# Patient Record
Sex: MALE | Race: WHITE | NOT HISPANIC OR LATINO | Employment: FULL TIME | URBAN - NONMETROPOLITAN AREA
[De-identification: names, ages, dates, MRNs, and addresses within clinical notes are randomized per-mention and may not be internally consistent; named-entity substitution may affect disease eponyms.]

---

## 2017-06-24 ENCOUNTER — NON-PROVIDER VISIT (OUTPATIENT)
Dept: URGENT CARE | Facility: PHYSICIAN GROUP | Age: 27
End: 2017-06-24

## 2017-06-24 DIAGNOSIS — Z02.1 PRE-EMPLOYMENT DRUG SCREENING: ICD-10-CM

## 2017-06-24 LAB
AMP AMPHETAMINE: NORMAL
COC COCAINE: NORMAL
INT CON NEG: NORMAL
INT CON POS: NORMAL
MET METHAMPHETAMINES: NORMAL
OPI OPIATES: NORMAL
PCP PHENCYCLIDINE: NORMAL
POC DRUG COMMENT 753798-OCCUPATIONAL HEALTH: NEGATIVE
THC: NORMAL

## 2017-06-24 PROCEDURE — 80305 DRUG TEST PRSMV DIR OPT OBS: CPT | Performed by: PHYSICIAN ASSISTANT

## 2018-10-17 ENCOUNTER — HOSPITAL ENCOUNTER (EMERGENCY)
Facility: MEDICAL CENTER | Age: 28
End: 2018-10-18
Attending: EMERGENCY MEDICINE
Payer: COMMERCIAL

## 2018-10-17 VITALS
HEART RATE: 73 BPM | WEIGHT: 186.29 LBS | RESPIRATION RATE: 16 BRPM | DIASTOLIC BLOOD PRESSURE: 73 MMHG | OXYGEN SATURATION: 97 % | SYSTOLIC BLOOD PRESSURE: 117 MMHG | HEIGHT: 74 IN | TEMPERATURE: 98.1 F | BODY MASS INDEX: 23.91 KG/M2

## 2018-10-17 DIAGNOSIS — T15.01XA CORNEAL FOREIGN BODY WITH RESIDUAL MATERIAL, RIGHT, INITIAL ENCOUNTER: ICD-10-CM

## 2018-10-17 PROCEDURE — 99284 EMERGENCY DEPT VISIT MOD MDM: CPT

## 2018-10-17 ASSESSMENT — PAIN DESCRIPTION - DESCRIPTORS: DESCRIPTORS: BURNING

## 2018-10-17 ASSESSMENT — PAIN SCALES - GENERAL: PAINLEVEL_OUTOF10: 9

## 2018-10-18 ENCOUNTER — TELEPHONE (OUTPATIENT)
Dept: PHARMACY | Facility: MEDICAL CENTER | Age: 28
End: 2018-10-18

## 2018-10-18 PROCEDURE — 700111 HCHG RX REV CODE 636 W/ 250 OVERRIDE (IP): Performed by: EMERGENCY MEDICINE

## 2018-10-18 PROCEDURE — 90715 TDAP VACCINE 7 YRS/> IM: CPT | Performed by: EMERGENCY MEDICINE

## 2018-10-18 PROCEDURE — 90471 IMMUNIZATION ADMIN: CPT

## 2018-10-18 PROCEDURE — 303754 HCHG EYE PATCH

## 2018-10-18 PROCEDURE — 65205 REMOVE FOREIGN BODY FROM EYE: CPT

## 2018-10-18 PROCEDURE — 700101 HCHG RX REV CODE 250: Performed by: EMERGENCY MEDICINE

## 2018-10-18 RX ORDER — PROPARACAINE HYDROCHLORIDE 5 MG/ML
1 SOLUTION/ DROPS OPHTHALMIC ONCE
Status: COMPLETED | OUTPATIENT
Start: 2018-10-18 | End: 2018-10-18

## 2018-10-18 RX ORDER — GENTAMICIN SULFATE 3 MG/ML
1 SOLUTION/ DROPS OPHTHALMIC
Qty: 1 BOTTLE | Refills: 0 | Status: SHIPPED | OUTPATIENT
Start: 2018-10-18 | End: 2018-10-25

## 2018-10-18 RX ORDER — HYDROCODONE BITARTRATE AND ACETAMINOPHEN 5; 325 MG/1; MG/1
1-2 TABLET ORAL EVERY 6 HOURS PRN
Qty: 7 TAB | Refills: 0 | Status: SHIPPED | OUTPATIENT
Start: 2018-10-18 | End: 2018-10-20

## 2018-10-18 RX ADMIN — CLOSTRIDIUM TETANI TOXOID ANTIGEN (FORMALDEHYDE INACTIVATED), CORYNEBACTERIUM DIPHTHERIAE TOXOID ANTIGEN (FORMALDEHYDE INACTIVATED), BORDETELLA PERTUSSIS TOXOID ANTIGEN (GLUTARALDEHYDE INACTIVATED), BORDETELLA PERTUSSIS FILAMENTOUS HEMAGGLUTININ ANTIGEN (FORMALDEHYDE INACTIVATED), BORDETELLA PERTUSSIS PERTACTIN ANTIGEN, AND BORDETELLA PERTUSSIS FIMBRIAE 2/3 ANTIGEN 0.5 ML: 5; 2; 2.5; 5; 3; 5 INJECTION, SUSPENSION INTRAMUSCULAR at 00:32

## 2018-10-18 RX ADMIN — FLUORESCEIN SODIUM 0.6 MG: 0.6 STRIP OPHTHALMIC at 00:30

## 2018-10-18 RX ADMIN — PROPARACAINE HYDROCHLORIDE 1 DROP: 5 SOLUTION/ DROPS OPHTHALMIC at 00:30

## 2018-10-18 NOTE — ED TRIAGE NOTES
Pt works as a  he noticed today at work pain to both eyes,pt states he has a welding burn to right and left eye right worrse, at the end of the work day the pain in the right eye was so bad he could not open the right eye, pt denies trauma, denies FB sensation, pt has history of the same several years ago, pt to triage with sunglasses on, pt has taken tylenol pta

## 2018-10-18 NOTE — ED NOTES
Pt provided discharge instructions, pt verbalized understanding of discharge instructions. Pt alert and oriented x4, gcs15, VSS, resp even and nl. Supplies provided to care for eye and eye patch. Pt provided RX x2. Pt verbalized understanding of RX. This RX explained narcotic agreement to pt. Pt signed narcotic agreement and verbalized understanding to this RN. Pt ambulates to exit with steady gait.

## 2018-10-18 NOTE — DISCHARGE INSTRUCTIONS
Do not drive, operate any machinery, or partake in dangerous activities that require maximum physical and mental performance while taking the prescribed pain killer. Do not take tylenol or tylenol containing products in addition to the pain killer that was prescibed, as the excess tylenol can cause life threatening liver problems. Do not combine this drug with alcohol or other sedatives or narcotics. Follow up with your primary care doctor in regards to the future management of this medication.

## 2018-10-18 NOTE — ED PROVIDER NOTES
"ED Provider Note    Scribed for Bebe Walton M.D. by Debbie King. 10/18/2018, 12:03 AM.    Primary care provider: Pcp Pt States None  Means of arrival: walk-in  History obtained from: Patient  History limited by: none    CHIEF COMPLAINT  Chief Complaint   Patient presents with   • Eye Pain       HPI  Bimal Wallis is a 28 y.o. male who presents to the Emergency Department with bilateral eye pain onset today R>L. 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 He states that he has pain in both of his eyes but the pain is worse in the right eye. Patient also reports that he has blurry vision in both eyes but he cannot open his right eye. Patient states that the last time he saw an eye doctor was approximately 18 years ago. Patient denies wearing eye contacts. He reports that his last tetanus vaccine was 5 years ago.    REVIEW OF SYSTEMS  Positives as above. Pertinent negatives include eye contacts. All other review of systems are negative    PAST MEDICAL HISTORY   None noted    SURGICAL HISTORY   has a past surgical history that includes other orthopedic surgery (Left, 2010).    SOCIAL HISTORY  Social History   Substance Use Topics   • Smoking status: Former Smoker     Packs/day: 1.00     Types: Cigarettes     Start date: 9/29/2007   • Smokeless tobacco: Never Used   • Alcohol use Yes      Comment: rare      History   Drug Use No       FAMILY HISTORY  History reviewed. No pertinent family history.    CURRENT MEDICATIONS  Reviewed. See Encounter Summary.     ALLERGIES  Allergies   Allergen Reactions   • Amoxicillin    • Biaxin [Clarithromycin]    • Pcn [Penicillins]        PHYSICAL EXAM  VITAL SIGNS: /73   Pulse 73   Temp 36.7 °C (98.1 °F) (Temporal)   Resp 16   Ht 1.88 m (6' 2\")   Wt 84.5 kg (186 lb 4.6 oz)   SpO2 97%   BMI 23.92 kg/m²    Pulse ox interpretation: I interpret this pulse " ox as normal.  Constitutional: Alert in no apparent distress.  HENT:  Normocephalic atraumatic, MMM.   Neck: Normal range of motion, No tenderness, Supple, No stridor.   Cardiovascular: Regular rate and rhythm, no murmurs.   Thorax & Lungs: Normal breath sounds, No respiratory distress, No wheezing, No chest tenderness.   Skin: Warm, Dry, No erythema, No rash.   Back: No bony tenderness, No CVA tenderness.   Extremities: Intact distal pulses, No edema, No tenderness, No cyanosis  Neurologic: Alert and oriented, No focal deficits noted.   Eye exam: PERRLA With flourescein negative Lizabeth's sign. Right eye has abrasion at 7 and 8 o'clock. Foreign body at 4 o'clock with rust ring, foreign body appears metallic.Bilateral conjunctival injection with more mild periorbital swelling to right compared to the left.    DIFFERENTIAL DIAGNOSIS AND WORK UP PLAN    12:03 AM Patient seen and examined at bedside. The patient presents with bilateral eye pain and the differential diagnosis includes but is not limited to heat carotidis, corneal foreign body, corneal abrasion. Patient will be treated with ADACEL injection 0.5 ml, proparacaine 0.5% 1 drop, fluorescein opthalmic strip 0.6 mg for his symptoms. I performed a foreign body removal in patient's right eye, restoring still in place. Patient was agreeable with plan of care at this time.    DIAGNOSTIC STUDIES / PROCEDURES     Foreign Body Removal Procedure Note    Indication: Suspected foreign body in right eye.     Procedure: The area of the foreign body was visualized via fluorescein  and slit lamp. Local anesthesia over the foreign body site was proparacaine. The foreign body was then removed using dremel and had the appearance of metal - FB removed however rust ring remained.  After the procedure wound and eye patch. The patient's tetanus status was updated with a tetanus booster.    The patient tolerated the procedure well.    Complications: None     COURSE & MEDICAL DECISION  MAKING  Pertinent Labs & Imaging studies reviewed. (See chart for details)    The patient will return for new or worsening symptoms and is stable at the time of discharge.    Patient is a 28-year-old male up-to-date on his tetanus who presented the emergency department today with chief complaint of bilateral eye pain.  He only had conjunctival injection on the left and possible irritation from the welding today however he had multiple corneal abrasions and a foreign body in the right eye with a rest ring even after removal of the metallic foreign body.  He will be started on ophthalmic antibiotics given pain management and a eye patch for the right eye and be referred to ophthalmology within the next 24-48 hours.  He understands return to the ED for any new or worsening symptoms.    Visual acuity   L eye 20/20  R eye 20/25  Bilat eye 20/20    In prescribing controlled substances to this patient, I certify that I have obtained and reviewed the medical history of Bimal Wallis. I have also made a good marylin effort to obtain applicable records from other providers who have treated the patient and records did not demonstrate any increased risk of substance abuse that would prevent me from prescribing controlled substances.     I have conducted a physical exam and documented it. I have reviewed Mr. Wallis’S prescription history as maintained by the [place] Prescription Monitoring Program.     I have assessed the patient’s risk for abuse, dependency, and addiction using the validated Opioid Risk Tool available at https://www.mdcalc.com/qauzju-cqwf-wxbo-ort-narcotic-abuse. 1    Given the above, I believe the benefits of controlled substance therapy outweigh the risks. The reasons for prescribing controlled substances include non-narcotic, oral analgesic alternatives have been inadequate for pain control. Accordingly, I have discussed the risk and benefits, treatment plan, and alternative therapies with the patient.        DISPOSITION:  Patient will be discharged home in stable condition.    FOLLOW UP:  Abad Sanchez M.D.  [id number]  [place] [address]  [phone]    Call on 10/18/2018  to make an appointment for follow up    [facility], Emergency Dept  [address]  [place] [address] [phone]    If symptoms worsen      OUTPATIENT MEDICATIONS:  New Prescriptions    GENTAMICIN (GARAMYCIN) 0.3 % OINTMENT    Place 0.5 Inches in right eye 3 times a day for 7 days.    HYDROCODONE-ACETAMINOPHEN (NORCO) 5-325 MG TAB PER TABLET    Take 1-2 Tabs by mouth every 6 hours as needed (severe pain) for up to 2 days.       FINAL IMPRESSION  1. Corneal foreign body with residual material, right, initial encounter          Debbie SINGH (Scribe), am scribing for, and in the presence of, Bebe Walton M.D..    Electronically signed by: Debbie King (Scribe), 10/18/2018    IBebe M.D. personally performed the services described in this documentation, as scribed by Debbie King in my presence, and it is both accurate and complete. C.    The note accurately reflects work and decisions made by me.  Bebe Walton  10/18/2018  4:34 AM    This dictation has been created using voice recognition software and/or scribes. The accuracy of the dictation is limited by the abilities of the software and the expertise of the scribes. I expect there may be some errors of grammar and possibly content. I made every attempt to manually correct the errors within my dictation. However, errors related to voice recognition software and/or scribes may still exist and should be interpreted within the appropriate context.

## 2018-10-18 NOTE — ED NOTES
Patient's wife called and she has been unable to fill rx for gentamicin ointment as it is unavailable in the community.   Rx changed to gentamicin ophthalmic solution, I drop in eye 5 times daily x 7 days. 1 bottle, no refills.  Clarithromycin allergy - unknown reaction since he was little. Unable to prescribe erythromycin ointment.     Fanny Gutierrez, PharmD

## 2018-10-18 NOTE — ED NOTES
Pt ambulated to ED 19 with steady gait. Pt alert and oriented x4, gcs 15, resp even and nl. Pt assisted with changed into gown. Pt on full cardiac monitor. Pt provided warm blanket for comfort. Call light within reach, instructed how to use call light. Bed locked in low position. Chart up for ERP. Will continue to monitor.

## 2018-10-18 NOTE — ED NOTES
Kittrell 1 Fall Risk Assessment Tool     Present to ED because of fall  NO  (Syncope, seizure, or ALOC)  Age>70   NO    Altered Mental Status:  (Intoxicated with Alcohol or Substance Confusion,  Inability to follow instructions, disorientation) NO    Impaired Mobility:  Ambulates or transfers with assistive devices or assist  Ambulates with unsteady gait and no assistance  Unable to ambulate or transfer   NO    Nursing Judgement  (Bowel or bladder incontinence, diarrhea, urinary   frequency or urgency, leg weakness, orthostatic   hypotension, dizziness or vertigo, narcotic use).   NO     Fall Risk Interventions  -Familiarize the patient with environment.  -Place call light within reach and demonstrate call light use.  -Place stretcher in low position and brakes locked  -Keep floor surfaces clean and dry.  -Keep patient care areas uncluttered.

## 2018-10-19 ENCOUNTER — PATIENT OUTREACH (OUTPATIENT)
Dept: HEALTH INFORMATION MANAGEMENT | Facility: OTHER | Age: 28
End: 2018-10-19

## 2018-10-19 ENCOUNTER — OCCUPATIONAL MEDICINE (OUTPATIENT)
Dept: OCCUPATIONAL MEDICINE | Facility: CLINIC | Age: 28
End: 2018-10-19
Payer: COMMERCIAL

## 2018-10-19 VITALS
OXYGEN SATURATION: 96 % | HEIGHT: 74 IN | HEART RATE: 82 BPM | TEMPERATURE: 98.5 F | DIASTOLIC BLOOD PRESSURE: 62 MMHG | SYSTOLIC BLOOD PRESSURE: 94 MMHG | BODY MASS INDEX: 23.74 KG/M2 | WEIGHT: 185 LBS

## 2018-10-19 DIAGNOSIS — S05.01XD ABRASION OF RIGHT CORNEA, SUBSEQUENT ENCOUNTER: ICD-10-CM

## 2018-10-19 PROCEDURE — 99202 OFFICE O/P NEW SF 15 MIN: CPT | Performed by: PREVENTIVE MEDICINE

## 2018-10-19 NOTE — PROGRESS NOTES
10/19/2018 1047 - Discharge Outreach - received call from patient. States he was told to f/u with opthamology, but when he called the office, was told he needed MD in ED to call(or possibly needing referral?). Transferred call to ED.

## 2018-10-19 NOTE — LETTER
"EMPLOYEE’S CLAIM FOR COMPENSATION/ REPORT OF INITIAL TREATMENT  FORM C-4    EMPLOYEE’S CLAIM - PROVIDE ALL INFORMATION REQUESTED   First Name  Bimal Last Name  Kadi Birthdate                    1990                Sex  male Claim Number   Home Address  [address] Age  28 y.o. 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 (if applicable)  Welding, cutting/grinding steel    How did this injury or occupational disease occur? (Be specific an answer in detail. Use additional sheet if necessary)  Not sure if something was in my eye or it was flash burn, but I had persist pain all day long. Eyes were blood shot red.   Happened throughout the day welding and grinding. Eye got severely irritated and sensitive to light.   If you believe that you have an occupational disease, when did you first have knowledge of the disability and it relationship to your employment?  N/A Witnesses to the Accident  Another coworker       Nature of Injury or Occupational Disease  Foreign Body   Part(s) of Body Injured or " Affected  Eye (R)     I certify that the above is true and correct to the best of my knowledge and that I have provided this information in order to obtain the benefits of [place]  I hereby authorize any physician, chiropractor, surgeon, practitioner, or other person, any hospital, including [facility], any medical service organization, any insurance company, or other institution or organization to release to each other, any medical or other information, including benefits paid or payable, pertinent to this injury or disease, except information relative to diagnosis, treatment and/or counseling for AIDS, psychological conditions, alcohol or controlled substances, for which I must give specific authorization.  A Photostat of this authorization shall be as valid as the original.     Date   Place   Employee’s Signature   THIS REPORT MUST BE COMPLETED AND MAILED WITHIN 3 WORKING DAYS OF TREATMENT   Place  [facility]  Name of [facility]   Date  10/19/2018 Diagnosis  (S05.01XD) Abrasion of right cornea, subsequent encounter Is there evidence the injured employee was under the influence of alcohol and/or another controlled substance at the time of accident?   Hour  4:32 PM Description of Injury or Disease  The encounter diagnosis was Abrasion of right cornea, subsequent encounter. No   Treatment  Foreign body removal  Have you advised the patient to remain off work five days or more? No   X-Ray Findings      If Yes   From Date  To Date      From information given by the employee, together with medical evidence, can you directly connect this injury or occupational disease as job incurred?  Yes If No Full Duty  No Modified Duty      Is additional medical care by a physician indicated?  Yes If Modified Duty, Specify any Limitations / Restrictions  No  "welding   Do you know of any previous injury or disease contributing to this condition or occupational disease?                            No   Date  10/22/2018 Print Doctor’s Name Joesph Quiles D.O. I certify the employer’s copy of  this form was mailed on:   Address  [address] [place] [address],   [address] Insurer’s Use Only     [facility] Zip  [address]    Provider’s Tax ID Number  [id number] Telephone  Dept: [phone]        e-SignTAYLORJOESPH D.O.   e-Signature: Dr. Len Hendricks, Medical Director Degree  DO        ORIGINAL-TREATING PHYSICIAN OR CHIROPRACTOR    PAGE 2-INSURER/TPA    PAGE 3-EMPLOYER    PAGE 4-EMPLOYEE             Form C-4 (rev10/07)              BRIEF DESCRIPTION OF RIGHTS AND BENEFITS  (Pursuant to [place] 616C.[place])    Notice of Injury or Occupational Disease (Incident Report Form C-1): If an injury or occupational disease (OD) arises out of and in the  course of employment, you must provide written notice to your employer as soon as practicable, but no later than 7 days after the accident or  OD. Your employer shall maintain a sufficient supply of the required forms.    Claim for Compensation (Form C-4): If medical treatment is sought, the form C-4 is available at the place of initial treatment. A completed  \"Claim for Compensation\" (Form C-4) must be filed within 90 days after an accident or OD. The treating physician or chiropractor must,  within 3 working days after treatment, complete and mail to the employer, the employer's insurer and third-party , the Claim for  Compensation.    Medical Treatment: If you require medical treatment for your on-the-job injury or OD, you may be required to select a physician or  chiropractor from a list provided by your workers’ compensation insurer, if it has contracted with an Organization for Managed Care (MCO) or  Preferred Provider Organization (PPO) or providers of health care. If your employer has not entered into a contract with an MCO " or PPO, you  may select a physician or chiropractor from the Panel of Physicians and Chiropractors. Any medical costs related to your industrial injury or  OD will be paid by your insurer.    Temporary Total Disability (TTD): If your doctor has certified that you are unable to work for a period of at least 5 consecutive days, or 5  cumulative days in a 20-day period, or places restrictions on you that your employer does not accommodate, you may be entitled to TTD  compensation.    Temporary Partial Disability (TPD): If the wage you receive upon reemployment is less than the compensation for TTD to which you are  entitled, the insurer may be required to pay you TPD compensation to make up the difference. TPD can only be paid for a maximum of 24  months.    Permanent Partial Disability (PPD): When your medical condition is stable and there is an indication of a PPD as a result of your injury or  OD, within 30 days, your insurer must arrange for an evaluation by a rating physician or chiropractor to determine the degree of your PPD. The  amount of your PPD award depends on the date of injury, the results of the PPD evaluation and your age and wage.    Permanent Total Disability (PTD): If you are medically certified by a treating physician or chiropractor as permanently and totally disabled  and have been granted a PTD status by your insurer, you are entitled to receive monthly benefits not to exceed 66 2/3% of your average  monthly wage. The amount of your PTD payments is subject to reduction if you previously received a PPD award.    Vocational Rehabilitation Services: You may be eligible for vocational rehabilitation services if you are unable to return to the job due to a  permanent physical impairment or permanent restrictions as a result of your injury or occupational disease.    Transportation and Per Armando Reimbursement: You may be eligible for travel expenses and per armando associated with medical  treatment.    Reopening: You may be able to reopen your claim if your condition worsens after claim closure.    Appeal Process: If you disagree with a written determination issued by the insurer or the insurer does not respond to your request, you may  appeal to the Department of Administration, , by following the instructions contained in your determination letter. You must  appeal the determination within 70 days from the date of the determination letter at [address], [address], [place], [place]  [address], or [address], [address], [place], [place] [address]. If you disagree with the  decision, you may appeal to the  Department of Administration, . You must file your appeal within 30 days from the date of the  decision  letter at [address], [address], [place], [place] [address], or [address], [place], [place] [address]. If you  disagree with a decision of an , you may file a petition for judicial review with the District Court. You must do so within 30  days of the Appeal Officer’s decision. You may be represented by an  at your own expense or you may contact the [facility] for possible  representation.    [place]  for Injured Workers (NAIW): If you disagree with a  decision, you may request that NAIW represent you  without charge at an  Hearing. For information regarding denial of benefits, you may contact the [facility] at: [address], [place] [address], [phone], or [address], [place] [address], [phone]    To File a Complaint with the Division: If you wish to file a complaint with the  of the Division of Industrial Relations (DIR),  please contact the Workers’ Compensation Section, [address], [address], [place], [place] [address], telephone [phone]) [phone], or  [address], [address], [place], [place] [address], telephone [phone].    For assistance with Workers’ Compensation Issues: you may contact the Office of the Governor Consumer Health Assistance, [address] [place] [address], [address], [place], [place] [address], Toll Free 1-774.940.3029, Web site: http://ARKeX.[facility].[email or web address]., E-mail  [email or web address].                                                                                                                                                                                                                                   __________________________________________________________________                                                                   _________________                Employee Name / Signature                                                                                                                                                       Date                                                                                                                                                                                                     D-2 (rev. 10/07)

## 2018-10-19 NOTE — LETTER
[address],   [address] [address]  Phone:  [phone] - Fax:  [phone]   Occupational Health [facility] Progress Report and Disability Certification  Date of Service: 10/19/2018   No Show:  No  Date / Time of Next Visit: 10/25/2018   Claim Information   Patient Name: Bimal Wallis  Claim Number:     Employer:   [employer] Date of Injury: 10/17/2018     Insurer / TPA: [employer]  ID / SSN:     Occupation: -Fabricator  Diagnosis: The encounter diagnosis was Abrasion of right cornea, subsequent encounter.    Medical Information   Related to Industrial Injury? Yes    Subjective Complaints:  DOI 10/17/2018: 29 yo male presents with bilateral eye pain R>L. 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  He was seen in the ER where as noted he had multiple right sided corneal abrasions with a small metallic foreign body with the rust ring.  Using gentamicin drops.   Objective Findings: Eyes: Small rust ring at about the 4 o'clock position.  Some scleral injection.  No purulent discharge.  Vision 20/15 in both eyes individually and 20/13 combined.   Pre-Existing Condition(s):     Assessment:   Condition Improved    Status: Additional Care Required  Permanent Disability:No    Plan:      Diagnostics:      Comments:  Referral to ophthalmology on urgent basis  Continue gentamicin ointment  Restricted duty  Follow-up Thursday      Disability Information   Status: Released to Restricted Duty    From:  10/19/2018  Through: 10/25/2018 Restrictions are: Temporary   Physical Restrictions   Sitting:    Standing:    Stooping:    Bending:      Squatting:    Walking:    Climbing:    Pushing:      Pulling:    Other:    Reaching Above Shoulder (L):   Reaching Above Shoulder (R):       Reaching Below Shoulder (L):    Reaching Below  Shoulder (R):      Not to exceed Weight Limits   Carrying(hrs):   Weight Limit(lb):   Lifting(hrs):   Weight  Limit(lb):     Comments: No welding    Repetitive Actions   Hands: i.e. Fine Manipulations from Grasping:     Feet: i.e. Operating Foot Controls:     Driving / Operate Machinery:     Physician Name: Joesph Quiles D.O. Physician Signature: JOESPH Gaxiola D.O. e-Signature: Dr. Len Hendricks, Medical Director   Clinic Name / Location: [address],   [address] [place] [id number] Clinic Phone Number: Dept: [phone]   Appointment Time: 4:35 Pm Visit Start Time: 4:32 PM   Check-In Time:  4:31 Pm Visit Discharge Time: 5:31 PM   Original-Treating Physician or Chiropractor    Page 2-Insurer/TPA    Page 3-Employer    Page 4-Employee

## 2018-10-19 NOTE — PROGRESS NOTES
"Subjective:      Bimal Wallis is a 28 y.o. male who presents with Follow-Up (WC DOI 10/17/18 (R) Eye Injury -better)      DOI 10/17/2018: 27 yo male presents with bilateral eye pain R>L. 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  He was seen in the ER where as noted he had multiple right sided corneal abrasions with a small metallic foreign body with the rust ring.  Using gentamicin drops.     HPI    ROS  ROS: All systems were reviewed on intake form, form was reviewed and signed. See scanned documents in media. Pertinent positives and negatives included in HPI.    PMH: No pertinent past medical history to this problem  MEDS: Medications were reviewed in Epic  ALLERGIES:   Allergies   Allergen Reactions   • Amoxicillin    • Biaxin [Clarithromycin]    • Pcn [Penicillins]      SOCHX: Works as a  at [employer]   FH: No pertinent family history to this problem     Objective:     BP (!) 94/62   Pulse 82   Temp 36.9 °C (98.5 °F)   Ht 1.88 m (6' 2\")   Wt 83.9 kg (185 lb)   SpO2 96%   BMI 23.75 kg/m²      Physical Exam   Constitutional: He is oriented to person, place, and time. He appears well-developed and well-nourished.   Cardiovascular: Normal rate.    Pulmonary/Chest: Effort normal.   Neurological: He is alert and oriented to person, place, and time.   Skin: Skin is warm and dry.   Psychiatric: He has a normal mood and affect. Judgment normal.       Eyes: Small rust ring at about the 4 o'clock position.  Some scleral injection.  No purulent discharge.  Vision 20/15 in both eyes individually and 20/13 combined.       Assessment/Plan:     1. Abrasion of right cornea, subsequent encounter  - REFERRAL TO OPHTHALMOLOGY    Referral to ophthalmology on urgent basis  Continue gentamicin ointment  Restricted duty  Follow-up Thursday  "

## 2018-10-22 ENCOUNTER — TELEPHONE (OUTPATIENT)
Dept: OCCUPATIONAL MEDICINE | Facility: CLINIC | Age: 28
End: 2018-10-22

## 2018-10-22 NOTE — TELEPHONE ENCOUNTER
1. Caller Name: Bimal Wallis                                           Call Back Number: [phone] (home)         Patient approves a detailed voicemail message: N\A    Spoke with pt and he stated that someone had already called him to let him know that he needs to come in the clinic to sign his C-4 so his workman's comp can be processed. Pt stated that he waited 45 minutes at the [facility] waiting until decided to leave. Pt is also aware of the Opthalmology clinic that he can be seen and stated that he will contact them. He said that he's been busy with work. Pt was told that he need to see Ophthalmology per Dr. Quiles's request and pt understood.

## 2018-10-22 NOTE — TELEPHONE ENCOUNTER
Phone Number Called: [phone] (home)       Message: Left message for patient to call back. Pt needs to be seen by ophthalmologist under medical necessity per Dr. Quiles. I have contacted Samira Bledsoe and the clinic that he can go to is:    [facility]  [address] [place] [address].  [place] [address]  [phone]     pt can walk-in and do not need to make an appointment.     Left Message for patient to call back: yes

## 2019-06-15 ENCOUNTER — OFFICE VISIT (OUTPATIENT)
Dept: URGENT CARE | Facility: PHYSICIAN GROUP | Age: 29
End: 2019-06-15
Payer: COMMERCIAL

## 2019-06-15 ENCOUNTER — HOSPITAL ENCOUNTER (EMERGENCY)
Facility: MEDICAL CENTER | Age: 29
End: 2019-06-15
Attending: EMERGENCY MEDICINE
Payer: COMMERCIAL

## 2019-06-15 VITALS
HEIGHT: 74 IN | SYSTOLIC BLOOD PRESSURE: 108 MMHG | BODY MASS INDEX: 23.06 KG/M2 | DIASTOLIC BLOOD PRESSURE: 76 MMHG | OXYGEN SATURATION: 96 % | WEIGHT: 179.68 LBS | TEMPERATURE: 97.8 F | HEART RATE: 64 BPM | RESPIRATION RATE: 16 BRPM

## 2019-06-15 VITALS
WEIGHT: 178 LBS | TEMPERATURE: 97.9 F | SYSTOLIC BLOOD PRESSURE: 116 MMHG | RESPIRATION RATE: 16 BRPM | BODY MASS INDEX: 22.84 KG/M2 | OXYGEN SATURATION: 98 % | DIASTOLIC BLOOD PRESSURE: 70 MMHG | HEART RATE: 80 BPM | HEIGHT: 74 IN

## 2019-06-15 DIAGNOSIS — T15.01XA FOREIGN BODY OF RIGHT CORNEA, INITIAL ENCOUNTER: ICD-10-CM

## 2019-06-15 PROCEDURE — 99284 EMERGENCY DEPT VISIT MOD MDM: CPT

## 2019-06-15 PROCEDURE — 65205 REMOVE FOREIGN BODY FROM EYE: CPT

## 2019-06-15 PROCEDURE — 700101 HCHG RX REV CODE 250: Performed by: EMERGENCY MEDICINE

## 2019-06-15 RX ORDER — PROPARACAINE HYDROCHLORIDE 5 MG/ML
1 SOLUTION/ DROPS OPHTHALMIC ONCE
Status: COMPLETED | OUTPATIENT
Start: 2019-06-15 | End: 2019-06-15

## 2019-06-15 RX ORDER — POLYMYXIN B SULFATE AND TRIMETHOPRIM 1; 10000 MG/ML; [USP'U]/ML
1 SOLUTION OPHTHALMIC EVERY 4 HOURS
Qty: 10 ML | Refills: 1 | Status: SHIPPED | OUTPATIENT
Start: 2019-06-15 | End: 2019-06-22

## 2019-06-15 RX ADMIN — FLUORESCEIN SODIUM 0.6 MG: 0.6 STRIP OPHTHALMIC at 11:20

## 2019-06-15 RX ADMIN — PROPARACAINE HYDROCHLORIDE 1 DROP: 5 SOLUTION/ DROPS OPHTHALMIC at 11:21

## 2019-06-15 NOTE — ED NOTES
"Ambulatory to 65 with same report as triage note.  Reports unable to open right eye due to pain.  States \"I dug some of it out\" with a Qtip  "

## 2019-06-15 NOTE — ED PROVIDER NOTES
ED Provider Note    CHIEF COMPLAINT  Chief Complaint   Patient presents with   • Foreign Body in Eye     pt states possible F.B. to right eye since thursday after grinding on metal       HPI  Bimal Wallis is a 28 y.o. male who presents with right eye pain, foreign body sensation.  Visual acuity at triage was normal.  Patient does not wear contact lenses.  Patient was grinding metal, he is a , speck of metal came underneath his protective eyewear as he tilted his head.  Patient states he was using a Q-tip on himself to help remove a foreign body but was unable to do so.  Similar occurrence months ago where he received tetanus shot.    REVIEW OF SYSTEMS  Constitutional: No fever  Ear nose throat: No facial pain  Eyes right eye foreign body sensation       PAST MEDICAL HISTORY  History reviewed. No pertinent past medical history.    FAMILY HISTORY  History reviewed. No pertinent family history.    SOCIAL HISTORY  Social History     Social History   • Marital status: Single     Spouse name: N/A   • Number of children: N/A   • Years of education: N/A     Social History Main Topics   • Smoking status: Current Every Day Smoker     Packs/day: 1.00     Types: Cigarettes     Start date: 9/29/2007   • Smokeless tobacco: Never Used   • Alcohol use Yes      Comment: rare   • Drug use: No   • Sexual activity: Not on file     Other Topics Concern   • Not on file     Social History Narrative    ** Merged History Encounter **            SURGICAL HISTORY  Past Surgical History:   Procedure Laterality Date   • OTHER ORTHOPEDIC SURGERY Left 2010    ORIF ankle        CURRENT MEDICATIONS  Home Medications     Reviewed by Trixie Haro R.N. (Registered Nurse) on 06/15/19 at 1108  Med List Status: Complete   Medication Last Dose Status        Patient Vishnu Taking any Medications                       ALLERGIES  Allergies   Allergen Reactions   • Amoxicillin    • Biaxin [Clarithromycin]    • Pcn [Penicillins]        PHYSICAL  "EXAM  VITAL SIGNS: /76   Pulse 74   Temp 36.6 °C (97.9 °F) (Temporal)   Resp 16   Ht 1.88 m (6' 2\")   Wt 81.5 kg (179 lb 10.8 oz)   SpO2 97%   BMI 23.07 kg/m²   Constitutional: Non-toxic appearance.   ENT: Oropharynx moist, nares clear, Nose normal.   Eyes: Pupils are equal.  Slight conjunctival irritation right compared to left.  Watery drainage.  No purulence.  Iris is normal shape.  Under slit-lamp exam there is a metallic embedded foreign body just off of center at 3:00.  No rust ring  Cardiovascular: Regular rate and rhythm, No murmurs.   Pulmonary: Normal breath sounds.      Procedure note: Corneal foreign body removal    Ophthaine eyedrops used to numb the right eye.  Tip of sterile Q-tip used to remove metallic foreign body, approximately one half broke off and was able to be removed, the remainder is embedded deep into the cornea.  Further attempts unsuccessful.    COURSE & MEDICAL DECISION MAKING  Pertinent Labs & Imaging studies reviewed. (See chart for details)  I spoke with on-call ophthalmology Dr. Vasques regarding the remainder of the foreign body in the cornea.  She is recommended antibiotic coverage, follow-up in our office on Monday morning for definitive removal.  Patient is comfortable with this plan.  He has refused prescription for pain medication.    FINAL IMPRESSION  1. Foreign body of right cornea, initial encounter          Electronically signed by: Hang Reyes, 6/15/2019 11:52 AM    " [de-identified] : The patient is ambulatory, well groomed, no signs of cachexia. [de-identified] : Normocephalic, atraumatic. [de-identified] : No conjunctival erythema, hyperemia, or discharge bilaterally; no ectropion, entropion, lagophthalmos, or lid malposition bilaterally. Pupils are equal, round, and reactive to light bilaterally. [de-identified] : There are no masses, scars, or lesions of the external ear. External nose is midline with no scars or masses. Gross hearing intact bilaterally (finger rub). Nasal mucosa has no edema, lesions, or signs of desiccation. Lips and gums have no masses, lesions, friability, or edema. [de-identified] : Neck is soft without edema, masses, or crepitus. Trachea midline. No thyromegaly; no palpable thyroid nodules. [de-identified] : No sign of respiratory distress, no tachypnea, no use of accessory respiratory muscles. [de-identified] : No swelling, tenderness, or varicosities of the extremities bilaterally; extremities are warm to palpation and pedal pulses intact. [de-identified] : Abdomen -\par on exam there is a abdominal wall incisional hernia at the left upper quadrant,\par non-tender, \par + large, heavy, hanging infraumbilical pannus noted,\par no intertrigo noted today, no erythema, no drainage or bleeding noted,\par mild hyperpigmentation noted in skin fold,\par WELL HEALED port site and mid line incision above the umbilicus noted, \par Photographs taken in office today\par  [de-identified] : On examination, patient has normal gait and station. [de-identified] : No focal areas of alopecia in the scalp, eyebrows, face, chest, or the extremities. No rashes of the head, neck, chest, abdomen, back, RUE, LUE, RLE, LLE noted. [de-identified] : CN 2-12 are intact, no sensory disturbances noted (e.g. by touch) [de-identified] : The patient is alert and oriented to time, place, and person. No obvious disorder of mood or affect such as anxiety or agitation.

## 2019-06-15 NOTE — ED TRIAGE NOTES
Pt to triage .  Chief Complaint   Patient presents with   • Foreign Body in Eye     pt states possible F.B. to right eye since thursday after grinding on metal

## 2021-02-24 ENCOUNTER — OFFICE VISIT (OUTPATIENT)
Dept: URGENT CARE | Facility: PHYSICIAN GROUP | Age: 31
End: 2021-02-24
Payer: COMMERCIAL

## 2021-02-24 ENCOUNTER — HOSPITAL ENCOUNTER (OUTPATIENT)
Facility: MEDICAL CENTER | Age: 31
End: 2021-02-24
Attending: PHYSICIAN ASSISTANT
Payer: COMMERCIAL

## 2021-02-24 VITALS
HEART RATE: 82 BPM | SYSTOLIC BLOOD PRESSURE: 104 MMHG | OXYGEN SATURATION: 97 % | BODY MASS INDEX: 23.2 KG/M2 | RESPIRATION RATE: 16 BRPM | WEIGHT: 180.8 LBS | HEIGHT: 74 IN | TEMPERATURE: 98.2 F | DIASTOLIC BLOOD PRESSURE: 60 MMHG

## 2021-02-24 DIAGNOSIS — R43.2 LOSS OF TASTE: ICD-10-CM

## 2021-02-24 DIAGNOSIS — R09.81 SINUS CONGESTION: ICD-10-CM

## 2021-02-24 LAB
COVID ORDER STATUS COVID19: NORMAL
SARS-COV-2 RNA RESP QL NAA+PROBE: DETECTED
SPECIMEN SOURCE: ABNORMAL

## 2021-02-24 PROCEDURE — 99202 OFFICE O/P NEW SF 15 MIN: CPT | Performed by: PHYSICIAN ASSISTANT

## 2021-02-24 PROCEDURE — U0003 INFECTIOUS AGENT DETECTION BY NUCLEIC ACID (DNA OR RNA); SEVERE ACUTE RESPIRATORY SYNDROME CORONAVIRUS 2 (SARS-COV-2) (CORONAVIRUS DISEASE [COVID-19]), AMPLIFIED PROBE TECHNIQUE, MAKING USE OF HIGH THROUGHPUT TECHNOLOGIES AS DESCRIBED BY CMS-2020-01-R: HCPCS

## 2021-02-24 PROCEDURE — U0005 INFEC AGEN DETEC AMPLI PROBE: HCPCS

## 2021-02-24 ASSESSMENT — ENCOUNTER SYMPTOMS
FEVER: 0
ABDOMINAL PAIN: 0
DIZZINESS: 0
DIARRHEA: 0
COUGH: 0
CHILLS: 0
SHORTNESS OF BREATH: 0
VOMITING: 0
MUSCULOSKELETAL NEGATIVE: 1
NAUSEA: 0
SORE THROAT: 1

## 2021-02-24 NOTE — PATIENT INSTRUCTIONS
INSTRUCTIONS FOR COVID-19 OR ANY OTHER INFECTIOUS RESPIRATORY ILLNESSES    The Centers for Disease Control and Prevention (CDC) states that early indications for COVID-19 include cough, shortness of breath, difficulty breathing, or at least two of the following symptoms: chills, shaking with chills, muscle pain, headache, sore throat, and loss of taste or smell. Symptoms can range from mild to severe and may appear up to two weeks after exposure to the virus.    The practice of self-isolation and quarantine helps protect the public and your family by  preventing exposure to people who have or may have a contagious disease. Please follow the prevention steps below as based on CDC guidelines:    WHEN TO STOP ISOLATION: Persons with COVID-19 or any other infectious respiratory illness who have symptoms and were advised to care for themselves at home may discontinue home isolation under the following conditions:  · At least 24 hours have passed since recovery defined as resolution of fever without the use of fever-reducing medications; AND,  · Improvement in respiratory symptoms (e.g., cough, shortness of breath); AND,  · At least 10 days have passed since symptoms first appeared and have had no subsequent illness.    MONITOR YOUR SYMPTOMS: If your illness is worsening, seek prompt medical attention. If you have a medical emergency and need to call 911, notify the dispatch personnel that you have, or are being evaluated for confirmed or suspected COVID-19 or another infectious respiratory illness. Wear a facemask if possible.    ACTIVITY RESTRICTION: restrict activities outside your home, except for getting medical care. Do not go to work, school, or public areas. Avoid using public transportation, ride-sharing, or taxis.    SCHEDULED MEDICAL APPOINTMENTS: Notify your provider that you have, or are being evaluated for, confirmed or suspected COVID-19 or another infectious respiratory. This will help the healthcare  provider’s office safely take care of you and keep other people from getting exposed or infected.    FACEMASKS, when to wear: Anytime you are away from your home or around other people or pets. If you are unable to wear one, maintain a minimum of 6 feet distancing from others.    LIVING ENVIRONMENT: Stay in a separate room from other people and pets. If possible, use a separate bathroom, have someone else care for your pets and avoid sharing household items. Any items used should be washed thoroughly with soap and water. Clean all “high-touch” surfaces every day. Use a household cleaning spray or wipe, according to the label instructions. High touch surfaces include (but are not limited to) counters, tabletops, doorknobs, bathroom fixtures, toilets, phones, keyboards, tablets, and bedside tables.     HAND WASHING: Frequently wash hands with soap and water for at least 20 seconds,  especially after blowing your nose, coughing, or sneezing; going to the bathroom; before and after interacting with pets; and before and after eating or preparing food. If hands are visibly dirty use soap and water. If soap and water are not available, use an alcohol-based hand  with at least 60% alcohol. Avoid touching your eyes, nose, and mouth with unwashed hands. Cover your coughs and sneezes with a tissue. Throw used tissues in a lined trash can. Immediately wash your hands.    ACTIVE/FACILITATED SELF-MONITORING: Follow instructions provided by your local health department or health professionals, as appropriate. When working with your local health department check their available hours.    [facility]   Phone Number   [facility] [phone]   [facility] [place] [phone]   [place] Call [address]   [place] [phone]     IF YOU HAVE CONFIRMED POSITIVE COVID-19:    Those who have completely recovered from COVID-19 may have immune-boosting antibodies in their plasma--called “convalescent plasma”--that could be  used to treat critically ill COVID19 patients.    [facility] is excited to begin working with [facility] on collecting convalescent plasma from  people who have recovered from COVID-19 as part of a program to treat patients infected with the virus. This FDA-approved “emergency investigational new drug” is a special blood product containing antibodies that may give patients an extra boost to fight the virus.    To be eligible to donate convalescent plasma, you must have a prior COVID-19 diagnosis documented by a laboratory test (or a positive test result for SARS-CoV-2 antibodies) and meet additional eligibility requirements.    If you are interested in donating convalescent plasma or have any additional questions, please contact the [facility] Convalescent Plasma  at [phone] or via e-mail at [email or web address].

## 2021-02-24 NOTE — PROGRESS NOTES
"Subjective:      Bimal Wallis is a 30 y.o. male who presents with Earache (bilateral ear pressure, sore throat, headache, x5 days )            HPI  Patient presents to urgent care reporting a 5 day history of headaches, sore throat, ear congestion, and fatigue. Today he lost he taste and smell. He is otherwise feeling well and denies fevers, chills, body aches, cough, chest pain, or SOB. He has no known medical problems and doesn't take any regular medications. No known sick contacts.       Review of Systems   Constitutional: Negative for chills and fever.   HENT: Positive for congestion and sore throat. Negative for ear pain.    Respiratory: Negative for cough and shortness of breath.    Cardiovascular: Negative for chest pain.   Gastrointestinal: Negative for abdominal pain, diarrhea, nausea and vomiting.   Genitourinary: Negative.    Musculoskeletal: Negative.    Skin: Negative for rash.   Neurological: Negative for dizziness.        + loss of taste/smell        Objective:     /60 (BP Location: Left arm, Patient Position: Sitting, BP Cuff Size: Adult)   Pulse 82   Temp 36.8 °C (98.2 °F) (Temporal)   Resp 16   Ht 1.88 m (6' 2\")   Wt 82 kg (180 lb 12.8 oz)   SpO2 97%   BMI 23.21 kg/m²        Physical Exam  Vitals and nursing note reviewed.   Constitutional:       General: He is not in acute distress.     Appearance: Normal appearance. He is well-developed. He is not ill-appearing.   HENT:      Head: Normocephalic and atraumatic.      Right Ear: Tympanic membrane, ear canal and external ear normal. There is no impacted cerumen.      Left Ear: Tympanic membrane, ear canal and external ear normal. There is no impacted cerumen.      Mouth/Throat:      Pharynx: Posterior oropharyngeal erythema present.   Eyes:      Conjunctiva/sclera: Conjunctivae normal.   Cardiovascular:      Rate and Rhythm: Normal rate and regular rhythm.      Heart sounds: Normal heart sounds. No murmur.   Pulmonary:      Effort: " Pulmonary effort is normal.      Breath sounds: Normal breath sounds. No wheezing or rales.   Musculoskeletal:         General: Normal range of motion.      Cervical back: Normal range of motion.   Skin:     General: Skin is warm and dry.   Neurological:      Mental Status: He is alert and oriented to person, place, and time.   Psychiatric:         Behavior: Behavior normal.          PMH:  has no past medical history on file.  MEDS: No current outpatient medications on file.  ALLERGIES:   Allergies   Allergen Reactions   • Amoxicillin    • Biaxin [Clarithromycin]    • Pcn [Penicillins]      SURGHX:   Past Surgical History:   Procedure Laterality Date   • OTHER ORTHOPEDIC SURGERY Left 2010    ORIF ankle      SOCHX:  reports that he has quit smoking. His smoking use included cigarettes. He started smoking about 13 years ago. He smoked 1.00 pack per day. He has never used smokeless tobacco. He reports current alcohol use. He reports that he does not use drugs.  FH: family history is not on file.       Assessment/Plan:        1. Sinus congestion    - SARS-CoV-2 PCR (24 hour In-House): Collect NP swab in V[facility]; Future    2. Loss of taste      Advised patient symptoms are most likely viral in etiology. Increased fluids and rest. Discussed use of OTC cough and cold medication and Tylenol/Motrin for symptomatic relief.  Return for reevaluation or follow-up with PCP if symptoms persist or worsen. Supportive care, differential diagnoses, and indications for immediate follow-up discussed with patient.   Pathogenesis of diagnosis discussed including typical length and natural progression.  The patient demonstrated a good understanding and agreed with the treatment plan and has no further questions regarding care.   Vital signs stable, patient in no acute respiratory distress. Patient instructed to self-isolate/quarantine. Discharge handout given.      Discussed with patient at length importance of communal effort to help decrease  the infection rate of COVID 19. Patient advised to avoid large gatherings of people and practice good hand hygiene and respiratory precautions.       This patient is evaluated under [facility] isolation protocols in urgent care.  Out of an abundance of caution I am wearing a N95 mask, protective eye gear, gloves and gown through all interaction with patient.

## 2021-05-12 ENCOUNTER — APPOINTMENT (OUTPATIENT)
Dept: RADIOLOGY | Facility: MEDICAL CENTER | Age: 31
End: 2021-05-12
Attending: EMERGENCY MEDICINE
Payer: COMMERCIAL

## 2021-05-12 ENCOUNTER — HOSPITAL ENCOUNTER (EMERGENCY)
Facility: MEDICAL CENTER | Age: 31
End: 2021-05-12
Attending: EMERGENCY MEDICINE | Admitting: EMERGENCY MEDICINE
Payer: COMMERCIAL

## 2021-05-12 VITALS
OXYGEN SATURATION: 98 % | BODY MASS INDEX: 23.37 KG/M2 | WEIGHT: 182.1 LBS | TEMPERATURE: 97.3 F | RESPIRATION RATE: 16 BRPM | DIASTOLIC BLOOD PRESSURE: 81 MMHG | HEIGHT: 74 IN | HEART RATE: 60 BPM | SYSTOLIC BLOOD PRESSURE: 115 MMHG

## 2021-05-12 DIAGNOSIS — S29.012A STRAIN OF THORACIC BACK REGION: ICD-10-CM

## 2021-05-12 DIAGNOSIS — M54.6 ACUTE MIDLINE THORACIC BACK PAIN: ICD-10-CM

## 2021-05-12 PROCEDURE — 700102 HCHG RX REV CODE 250 W/ 637 OVERRIDE(OP): Performed by: EMERGENCY MEDICINE

## 2021-05-12 PROCEDURE — 72070 X-RAY EXAM THORAC SPINE 2VWS: CPT

## 2021-05-12 PROCEDURE — A9270 NON-COVERED ITEM OR SERVICE: HCPCS | Performed by: EMERGENCY MEDICINE

## 2021-05-12 PROCEDURE — 99283 EMERGENCY DEPT VISIT LOW MDM: CPT

## 2021-05-12 RX ORDER — DEXAMETHASONE 4 MG/1
12 TABLET ORAL ONCE
Status: COMPLETED | OUTPATIENT
Start: 2021-05-12 | End: 2021-05-12

## 2021-05-12 RX ORDER — METHOCARBAMOL 500 MG/1
500 TABLET, FILM COATED ORAL ONCE
Status: COMPLETED | OUTPATIENT
Start: 2021-05-12 | End: 2021-05-12

## 2021-05-12 RX ORDER — METHOCARBAMOL 500 MG/1
500 TABLET, FILM COATED ORAL EVERY 6 HOURS PRN
Qty: 20 TABLET | Refills: 0 | Status: SHIPPED | OUTPATIENT
Start: 2021-05-12 | End: 2023-04-05

## 2021-05-12 RX ORDER — IBUPROFEN 600 MG/1
600 TABLET ORAL EVERY 6 HOURS PRN
Qty: 20 TABLET | Refills: 0 | Status: SHIPPED | OUTPATIENT
Start: 2021-05-12 | End: 2023-04-05

## 2021-05-12 RX ORDER — IBUPROFEN 600 MG/1
600 TABLET ORAL ONCE
Status: COMPLETED | OUTPATIENT
Start: 2021-05-12 | End: 2021-05-12

## 2021-05-12 RX ADMIN — IBUPROFEN 600 MG: 600 TABLET ORAL at 12:26

## 2021-05-12 RX ADMIN — METHOCARBAMOL 500 MG: 500 TABLET ORAL at 12:25

## 2021-05-12 RX ADMIN — DEXAMETHASONE 12 MG: 4 TABLET ORAL at 12:25

## 2021-05-12 ASSESSMENT — LIFESTYLE VARIABLES: DO YOU DRINK ALCOHOL: NO

## 2021-05-12 ASSESSMENT — PAIN DESCRIPTION - PAIN TYPE: TYPE: ACUTE PAIN

## 2021-05-12 NOTE — DISCHARGE INSTRUCTIONS
Follow-up with primary care as soon as possible for reevaluation, to establish care, for medication management and additional imaging/MRI, physical therapy or specialty evaluation if back pain persists.    Motrin every 6 hours as needed for discomfort.  Robaxin every 6 hours as needed for pain or spasm.    Slow stretching range of motion exercises encouraged.  Otherwise activity as tolerated.    Return the emergency department for persistent worsening back pain, chest pain or abdominal pain, shortness of breath, lower extremity weakness or paresthesias, incontinence or urinary retention, fever or other new concerns.

## 2021-05-12 NOTE — LETTER
"  FORM C-4:  EMPLOYEE’S CLAIM FOR COMPENSATION/ REPORT OF INITIAL TREATMENT  EMPLOYEE’S CLAIM - PROVIDE ALL INFORMATION REQUESTED   First Name Bimal Last Name Kadi Birthdate 1990  Sex male Claim Number   Home Address [address]   [facility]             Zip [address]                                   Age  30 y.o. Height  1.88 m (6' 2\") Weight  82.6 kg (182 lb 1.6 oz) [facility]     Mailing Address [address]  [facility]              Zip [address] Telephone  [phone] (home)  Primary Language Spoken   Insurer   Third Party   [employer] Employee's Occupation (Job Title) When Injury or Occupational Disease Occurred     Employer's Name [employer] Telephone [phone]    Employer Address [address] [facility] [[address]] Zip [address]   Date of Injury  4/9/2021       Hour of Injury  10:00 AM Date Employer Notified  5/11/2021 Last Day of Work after Injury or Occupational Disease  5/12/2021 Supervisor to Whom Injury Reported  Aura   Address or Location of Accident (if applicable) Work [1]   What were you doing at the time of accident? (if applicable) Lifting heavy steal beam    How did this injury or occupational disease occur? Be specific and answer in detail. Use additional sheet if necessary)  I was flipping a heavy beam and felt a couple pops in the middle of  my back and a few days later it progressively got worse.   If you believe that you have an occupational disease, when did you first have knowledge of the disability and it relationship to your employment? N/A Witnesses to the Accident  N/A   Nature of Injury or Occupational Disease  Workers' Compensation Part(s) of Body Injured or Affected  Lower Back Area (Lumbar Area & Lumbo-Sacral), N/A, N/A    I CERTIFY THAT THE ABOVE IS TRUE AND CORRECT TO THE BEST OF MY KNOWLEDGE AND THAT I HAVE PROVIDED THIS INFORMATION IN ORDER TO OBTAIN THE BENEFITS OF [place] OR [place]).  I HEREBY AUTHORIZE ANY PHYSICIAN, CHIROPRACTOR, SURGEON, PRACTITIONER, OR OTHER PERSON, ANY HOSPITAL, INCLUDING [facility] OR [facility], ANY MEDICAL SERVICE ORGANIZATION, ANY INSURANCE COMPANY, OR OTHER INSTITUTION OR ORGANIZATION TO RELEASE TO EACH OTHER, ANY MEDICAL OR OTHER INFORMATION, INCLUDING BENEFITS PAID OR PAYABLE, PERTINENT TO THIS INJURY OR DISEASE, EXCEPT INFORMATION RELATIVE TO DIAGNOSIS, TREATMENT AND/OR COUNSELING FOR AIDS, PSYCHOLOGICAL CONDITIONS, ALCOHOL OR CONTROLLED SUBSTANCES, FOR WHICH I MUST GIVE SPECIFIC AUTHORIZATION.  A PHOTOSTAT OF THIS AUTHORIZATION SHALL BE AS VALID AS THE ORIGINAL.  Date    05/12/21                                  Place   [facility]                                        Employee’s Signature   THIS REPORT MUST BE COMPLETED AND MAILED WITHIN 3 WORKING DAYS OF TREATMENT   Place [facility], EMERGENCY DEPT                       Name of Facility [facility]   Date  5/12/2021 Diagnosis  (M54.6) Acute midline thoracic back pain  (S29.012A) Strain of thoracic back region Is there evidence the injured employee was under the influence of alcohol and/or another controlled substance at the time of accident?   Hour  1:27 PM Description of Injury or Disease  Acute midline thoracic back pain  Strain of thoracic back region No   Treatment  Decadron, Motrin, Robaxin  Have you advised the patient to remain off work five days or more?         No   X-Ray Findings  Negative If Yes   From Date    To Date      From information given by the employee, together with medical evidence, can you directly connect this injury or occupational disease as job incurred?   Comments:Unknown If No, is employee capable of: Full Duty  Yes Modified Duty      Is additional medical care by a physician indicated? Yes  Comments:Workmen's Comp.,  "primary care for further work-up and imaging/MRI, physical therapy or specialty evaluation if pain persists; return to work recommendations If Modified Duty, Specify any Limitations / Restrictions       Do you know of any previous injury or disease contributing to this condition or occupational disease? No    Date 5/12/2021 Print Doctor’s Name BipinCelia melgarJosh H SAMANTHA certify the employer’s copy of this form was mailed on:   Address [address] [place] [id number] [phone] INSURER’S USE ONLY   Provider’s Tax ID Number [id number] Telephone Dept: [phone]    Doctor’s Signature salome-JOSH Carlos D.O. Degree  M.D.      Form C-4 (rev.10/07)                                                                         BRIEF DESCRIPTION OF RIGHTS AND BENEFITS  (Pursuant to [place] 616C.050)    Notice of Injury or Occupational Disease (Incident Report Form C-1): If an injury or occupational disease (OD) arises out of and in the course of employment, you must provide written notice to your employer as soon as practicable, but no later than 7 days after the accident or OD. Your employer shall maintain a sufficient supply of the required forms.    Claim for Compensation (Form C-4): If medical treatment is sought, the form C-4 is available at the place of initial treatment. A completed \"Claim for Compensation\" (Form C-4) must be filed within 90 days after an accident or OD. The treating physician or chiropractor must, within 3 working days after treatment, complete and mail to the employer, the employer's insurer and third-party , the Claim for Compensation.    Medical Treatment: If you require medical treatment for your on-the-job injury or OD, you may be required to select a physician or chiropractor from a list provided by your workers’ compensation insurer, if it has contracted with an Organization for Managed Care (MCO) or Preferred Provider Organization (PPO) or providers of health care. If your employer " has not entered into a contract with an MCO or PPO, you may select a physician or chiropractor from the Panel of Physicians and Chiropractors. Any medical costs related to your industrial injury or OD will be paid by your insurer.    Temporary Total Disability (TTD): If your doctor has certified that you are unable to work for a period of at least 5 consecutive days, or 5 cumulative days in a 20-day period, or places restrictions on you that your employer does not accommodate, you may be entitled to TTD compensation.    Temporary Partial Disability (TPD): If the wage you receive upon reemployment is less than the compensation for TTD to which you are entitled, the insurer may be required to pay you TPD compensation to make up the difference. TPD can only be paid for a maximum of 24 months.    Permanent Partial Disability (PPD): When your medical condition is stable and there is an indication of a PPD as a result of your injury or OD, within 30 days, your insurer must arrange for an evaluation by a rating physician or chiropractor to determine the degree of your PPD. The amount of your PPD award depends on the date of injury, the results of the PPD evaluation, your age and wage.    Permanent Total Disability (PTD): If you are medically certified by a treating physician or chiropractor as permanently and totally disabled and have been granted a PTD status by your insurer, you are entitled to receive monthly benefits not to exceed 66 2/3% of your average monthly wage. The amount of your PTD payments is subject to reduction if you previously received a lump-sum PPD award.    Vocational Rehabilitation Services: You may be eligible for vocational rehabilitation services if you are unable to return to the job due to a permanent physical impairment or permanent restrictions as a result of your injury or occupational disease.    Transportation and Per Armando Reimbursement: You may be eligible for travel expenses and per armando  associated with medical treatment.    Reopening: You may be able to reopen your claim if your condition worsens after claim closure.     Appeal Process: If you disagree with a written determination issued by the insurer or the insurer does not respond to your request, you may appeal to the Department of Administration, , by following the instructions contained in your determination letter. You must appeal the determination within 70 days from the date of the determination letter at [address], [address], [place], [place] [address], or [address], [place], [place] [address]. If you disagree with the  decision, you may appeal to the Department of Administration, . You must file your appeal within 30 days from the date of the  decision letter at [address], [address], [place], [place] [address], or [address], [place], [place] [address]. If you disagree with a decision of an , you may file a petition for judicial review with the District Court. You must do so within 30 days of the Appeal Officer’s decision. You may be represented by an  at your own expense or you may contact the [facility] for possible representation.    [place]  for Injured Workers (NAIW): If you disagree with a  decision, you may request that NAIW represent you without charge at an  Hearing. For information regarding denial of benefits, you may contact the [facility] at: [address], [address], [place] [address], [phone], or [address], [place] [address], [phone]    To File a Complaint with the Division: If you wish to file a complaint with the  of the Division of Industrial Relations (DIR),  please contact the Workers’ Compensation Section, [address], [address], [place], [place] [address], telephone [phone]  [phone], or [address], [address], [place], [place] [address], telephone [phone].    For assistance with Workers’ Compensation Issues: You may contact the [facility] Office for Consumer Health Assistance, [address], [address], [place], [place] [address], Toll Free 1-975.788.5593, Web site: http://[facility].[place].gov/Programs/YUSEF E-mail: yusef@[facility].[place].gov  D-2 (rev. 10/20)              __________________________________________________________________                                    _________________            Employee Name / Signature                                                                                                                            Date

## 2021-05-12 NOTE — ED NOTES
Agree with triage note. Pt ambulatory to 63 and changed into a gown. Personal belongings and call light within reach.

## 2021-05-12 NOTE — ED PROVIDER NOTES
"ED Provider Note    CHIEF COMPLAINT  Chief Complaint   Patient presents with   • Back Pain     Mid back pain for past month. Was lifting a steel beam at work and felt a few \"pops\"   • Numbness     Intermittent numbness in left arm       HPI  Bimal Wallis is a 30 y.o. male who ambulates to the emergency department through triage for mid back pain.  Patient states he was lifting a beam at work a few months ago when he felt a \"few pops\" in his mid back.  The following day he extended to catch a heavy beam and heard \"another pop.\"  He has had some discomfort in his mid back since that time.  Some pain at rest or in certain positions, worse with certain movements.  No lower extremity weakness or paresthesias.  No incontinence or urinary retention.  No fever or chills.  No IV drug use.  No abdominal pain.    He does describe history of some low back pain and sciatica, but no lower extremity symptoms since this injury.  He does describe some vague and intermittent left upper extremity paresthesias without known injury or neck pain.    REVIEW OF SYSTEMS  See HPI for further details. All other systems are negative.     PAST MEDICAL HISTORY   Denies    SOCIAL HISTORY  Social History     Tobacco Use   • Smoking status: Former Smoker     Packs/day: 1.00     Types: Cigarettes     Start date: 9/29/2007   • Smokeless tobacco: Never Used   Vaping Use   • Vaping Use: Every day   • Substances: Nicotine   Substance and Sexual Activity   • Alcohol use: Yes     Comment: rare   • Drug use: No   • Sexual activity: Not on file       SURGICAL HISTORY   has a past surgical history that includes other orthopedic surgery (Left, 2010).    CURRENT MEDICATIONS  Home Medications     Reviewed by Cheri Sandoval R.N. (Registered Nurse) on 05/12/21 at 1020  Med List Status: Partial   Medication Last Dose Status        Patient Vishnu Taking any Medications                       ALLERGIES  Allergies   Allergen Reactions   • Amoxicillin    • Biaxin " "[Clarithromycin]    • Pcn [Penicillins]        PHYSICAL EXAM  VITAL SIGNS: /61   Pulse 69   Temp 36.1 °C (97 °F) (Temporal)   Resp 14   Ht 1.88 m (6' 2\")   Wt 82.6 kg (182 lb 1.6 oz)   SpO2 97%   BMI 23.38 kg/m²   Pulse ox interpretation: I interpret this pulse ox as normal.  Constitutional: Alert in no apparent distress.  HENT: Normocephalic, atraumatic. Bilateral external ears normal, Nose normal.   Eyes: Pupils are equal and reactive, Conjunctiva normal.   Neck: Normal range of motion without pain or resistance.  Supple.  Cardiovascular: Normal peripheral perfusion.  Thorax & Lungs: Nonlabored respirations.  Abdomen: Soft, non-distended, non-tender to palpation. No palpable or pulsatile masses. No peritoneal signs. No CVA tenderness.  Skin: Warm, Dry, No erythema, No rash.   Musculoskeletal: Midline tenderness to palpation thoracic spine approximately T4-T8 without step-off.  No cutaneous changes.  No paraspinal discomfort.  Good range of motion in all major joints. No major deformities noted.   Neurologic: Alert and oriented x4.  Speech clear and cohesive.  Ambulates independently.  5 out of 5 strength in 4 extremities with proximal distal muscle groups.  Sensation tact light touch distally, saddle anesthesia.  Psychiatric: Affect normal, Judgment normal, Mood normal.       DIAGNOSTIC STUDIES / PROCEDURES    RADIOLOGY  DX-THORACIC SPINE-2 VIEWS   Final Result      No thoracic spondylosis. No fractures identified.          COURSE & MEDICAL DECISION MAKING  Nursing notes and vital signs were reviewed. (See chart for details)  The patients records were reviewed, history was obtained from the patient ;     ED evaluation for mid back pain is unrevealing, suspect thoracic strain.  X-ray following flexion and extension type injuries are unrevealing and did not reveal fracture or subluxation.  He is neurologically intact and nonfocal.  Pain is controlled with Motrin and Robaxin, Decadron was given as well.  " He ambulates independently.    Patient is stable for discharge at this time, anticipatory guidance provided, Motrin Robaxin for discomfort or spasm, close follow-up is encouraged with primary care or Workmen's Comp. for continued monitoring and referral for imaging/MRI, physical therapy or specialty evaluation if symptoms persist, and strict ED return instructions have been detailed. Patient is agreeable to the disposition and plan.    Patient's blood pressure was elevated in the emergency department, and has been referred to primary care for close monitoring.      FINAL IMPRESSION  (M54.6) Acute midline thoracic back pain  (S29.012A) Strain of thoracic back region      Electronically signed by: Bebe Voss D.O., 5/12/2021 1:00 PM      This dictation was created using voice recognition software. The accuracy of the dictation is limited to the abilities of the software. I expect there may be some errors of grammar and possibly content. The nursing notes were reviewed and certain aspects of this information were incorporated into this note.

## 2021-05-18 ENCOUNTER — OCCUPATIONAL MEDICINE (OUTPATIENT)
Dept: URGENT CARE | Facility: CLINIC | Age: 31
End: 2021-05-18
Payer: COMMERCIAL

## 2021-05-18 VITALS
HEIGHT: 74 IN | WEIGHT: 179 LBS | DIASTOLIC BLOOD PRESSURE: 72 MMHG | OXYGEN SATURATION: 97 % | BODY MASS INDEX: 22.97 KG/M2 | HEART RATE: 83 BPM | TEMPERATURE: 98 F | RESPIRATION RATE: 14 BRPM | SYSTOLIC BLOOD PRESSURE: 110 MMHG

## 2021-05-18 DIAGNOSIS — S29.012A STRAIN OF THORACIC BACK REGION: ICD-10-CM

## 2021-05-18 PROCEDURE — 99214 OFFICE O/P EST MOD 30 MIN: CPT | Performed by: NURSE PRACTITIONER

## 2021-05-18 RX ORDER — MELOXICAM 15 MG/1
15 TABLET ORAL DAILY
Qty: 30 TABLET | Refills: 0 | Status: SHIPPED | OUTPATIENT
Start: 2021-05-18 | End: 2023-04-05

## 2021-05-18 ASSESSMENT — ENCOUNTER SYMPTOMS
DIZZINESS: 0
BACK PAIN: 1
MYALGIAS: 0
VOMITING: 0
FEVER: 0
EYE REDNESS: 0
SORE THROAT: 0
CHILLS: 0
SHORTNESS OF BREATH: 0
NAUSEA: 0

## 2021-05-18 NOTE — LETTER
[address] [place] [address]  Phone:  [phone] - Fax:  [phone]   [facility] Progress Report and Disability Certification  Date of Service: 5/18/2021   No Show:  No  Date / Time of Next Visit: 5/24/2021 @ 8:15AM [facility]   Claim Information   Patient Name: Bimal Wallis  Claim Number:     Employer:   [employer] Date of Injury: 4/9/2021     Insurer / TPA: [employer]  ID / SSN:     Occupation:   Diagnosis: The encounter diagnosis was Strain of thoracic back region.    Medical Information   Related to Industrial Injury? Yes    Subjective Complaints:  DOI 4/9/21: Patient is a 30-year-old male who presents to the urgent care for follow-up visit after he was initially evaluated in emergency room 5/12.  Evidently patient was lifting a beam at work and felt a few pops in his mid back pain has persisted since.  In the emergency room x-ray imagings were negative he was prescribed anti-inflammatories and muscle relaxants which he has been taking continuously having no improvement of symptoms.  Pain is sharp with certain movements.  Denies numbness and tingling in bilateral lower extremities.  Pain is midline.  He did attempt to go to a chiropractor having no improvement.  Denies loss of bowel or bladder.  Denies previous injury to the low back injury.   Objective Findings: Spine- with thoracic midline tenderness, no step-off or deformity. Without scoliosis or kyphosis. Without noted paraspinous spasm. Pain with FROM with lateral bend, and flexion/extension. Without noted tenderness over the sacroiliac notches. Sensation intact bilaterally, BLE motor 5/5 and symmetrical  strength. Negative Straight leg raise.  Gait- WNL without foot drop.      Pre-Existing Condition(s):     Assessment:   Condition Same    Status: Discharged / Care Transfer  Permanent Disability:No    Plan: Transfer CarePT       Diagnostics:      Comments:       Disability Information   Status: Released to Full Duty    From:  5/18/2021  Through: 5/24/2021 Restrictions are:     Physical Restrictions   Sitting:    Standing:    Stooping:    Bending:      Squatting:    Walking:    Climbing:    Pushing:      Pulling:    Other:    Reaching Above Shoulder (L):   Reaching Above Shoulder (R):       Reaching Below Shoulder (L):    Reaching Below Shoulder (R):      Not to exceed Weight Limits   Carrying(hrs):   Weight Limit(lb):   Lifting(hrs):   Weight  Limit(lb):     Comments: Patient declining Toradol injection in clinic.  Encourage to continue with range of motion, heat, massage.  Will trial prescribed anti-inflammatory.  At this time will transfer care to occupational medicine and place referral to physical therapy m as he is having no improvement in symptoms.  He does not want to be placed on temporary work restrictions.  We will released to full duty at this time.  He will follow up in 1 week.    Repetitive Actions   Hands: i.e. Fine Manipulations from Grasping:     Feet: i.e. Operating Foot Controls:     Driving / Operate Machinery:     Provider Name:   LETTY Márquez Physician Signature:  Physician Name:     Clinic Name / Location: [address]  [place] [address] Clinic Phone Number: Dept: [phone]   Appointment Time: 6:00 Pm Visit Start Time: 6:10 PM   Check-In Time:  5:54 Pm Visit Discharge Time:  6:58 PM   Original-Treating Physician or Chiropractor    Page 2-Insurer/TPA    Page 3-Employer    Page 4-Employee

## 2021-05-19 NOTE — PROGRESS NOTES
"Subjective:   Bimal Wallis  is a 30 y.o. male who presents for Follow-Up ( [facility] 4/9/2021 back pain - same)    DOI 4/9/21: Patient is a 30-year-old male who presents to the urgent care for follow-up visit after he was initially evaluated in emergency room 5/12.  Evidently patient was lifting a beam at work and felt a few pops in his mid back pain has persisted since.  In the emergency room x-ray imagings were negative he was prescribed anti-inflammatories and muscle relaxants which he has been taking continuously having no improvement of symptoms.  Pain is sharp with certain movements.  Denies numbness and tingling in bilateral lower extremities.  Pain is midline.  He did attempt to go to a chiropractor having no improvement.  Denies loss of bowel or bladder.  Denies previous injury to the low back injury.   HPI  Review of Systems   Constitutional: Negative for chills and fever.   HENT: Negative for sore throat.    Eyes: Negative for redness.   Respiratory: Negative for shortness of breath.    Cardiovascular: Negative for chest pain.   Gastrointestinal: Negative for nausea and vomiting.   Genitourinary: Negative for dysuria.   Musculoskeletal: Positive for back pain. Negative for myalgias.   Skin: Negative for rash.   Neurological: Negative for dizziness.     Allergies   Allergen Reactions   • Amoxicillin    • Biaxin [Clarithromycin]    • Pcn [Penicillins]       Objective:   /72 (BP Location: Left arm, Patient Position: Sitting, BP Cuff Size: Adult)   Pulse 83   Temp 36.7 °C (98 °F) (Temporal)   Resp 14   Ht 1.88 m (6' 2\")   Wt 81.2 kg (179 lb)   SpO2 97%   BMI 22.98 kg/m²   Physical Exam  Constitutional:       Appearance: Normal appearance. He is not ill-appearing or toxic-appearing.   HENT:      Head: Normocephalic.      Right Ear: External ear normal.      Left Ear: External ear normal.      Nose: Nose normal.      Mouth/Throat:      Lips: Pink.      Mouth: Mucous membranes are moist.   Eyes:      " General: Lids are normal.         Right eye: No discharge.         Left eye: No discharge.   Pulmonary:      Effort: Pulmonary effort is normal. No accessory muscle usage or respiratory distress.   Musculoskeletal:      Cervical back: Normal and full passive range of motion without pain.      Thoracic back: Tenderness present. No swelling, deformity or spasms. Decreased range of motion.      Lumbar back: Normal.        Back:    Skin:     Coloration: Skin is not pale.   Neurological:      Mental Status: He is alert and oriented to person, place, and time.   Psychiatric:         Mood and Affect: Mood normal.         Thought Content: Thought content normal.       Spine- with thoracic midline tenderness, no step-off or deformity. Without scoliosis or kyphosis. Without noted paraspinous spasm. Pain with FROM with lateral bend, and flexion/extension. Without noted tenderness over the sacroiliac notches. Sensation intact bilaterally, BLE motor 5/5 and symmetrical  strength. Negative Straight leg raise.  Gait- WNL without foot drop.      Assessment/Plan:   1. Strain of thoracic back region  - REFERRAL TO OCCUPATIONAL MEDICINE  - REFERRAL TO PHYSICAL THERAPY  - meloxicam (MOBIC) 15 MG tablet; Take 1 tablet by mouth every day.  Dispense: 30 tablet; Refill: 0  Patient declining Toradol injection in clinic.  Encourage to continue with range of motion, heat, massage.  Will trial prescribed anti-inflammatory.  At this time will transfer care to occupational medicine and place referral to physical therapy m as he is having no improvement in symptoms.  He does not want to be placed on temporary work restrictions.  We will released to full duty at this time.  He will follow up in 1 week.  Differential diagnosis, natural history, supportive care, and indications for immediate follow-up discussed.

## 2021-05-24 ENCOUNTER — OCCUPATIONAL MEDICINE (OUTPATIENT)
Dept: OCCUPATIONAL MEDICINE | Facility: CLINIC | Age: 31
End: 2021-05-24
Payer: COMMERCIAL

## 2021-05-24 VITALS
OXYGEN SATURATION: 98 % | BODY MASS INDEX: 23.23 KG/M2 | SYSTOLIC BLOOD PRESSURE: 110 MMHG | TEMPERATURE: 97.5 F | RESPIRATION RATE: 14 BRPM | HEART RATE: 65 BPM | DIASTOLIC BLOOD PRESSURE: 82 MMHG | WEIGHT: 181 LBS | HEIGHT: 74 IN

## 2021-05-24 DIAGNOSIS — Z53.21 PROCEDURE AND TREATMENT NOT CARRIED OUT DUE TO PATIENT LEAVING PRIOR TO BEING SEEN BY HEALTH CARE PROVIDER: ICD-10-CM

## 2021-05-24 PROCEDURE — 99999 PR NO CHARGE: CPT | Performed by: NURSE PRACTITIONER

## 2021-05-24 NOTE — PROGRESS NOTES
Patient left visit before being seen.  He was not in the exam room as stated.  Provider went into complete visit patient apparently walked out past  staff without rescheduling appointment.  No services provided.  PAR reached out to patient requesting he return to office was sent to voicemail.  I recommend he reschedule his appointment at this time.

## 2023-04-05 ENCOUNTER — OFFICE VISIT (OUTPATIENT)
Dept: URGENT CARE | Facility: CLINIC | Age: 33
End: 2023-04-05
Payer: COMMERCIAL

## 2023-04-05 VITALS
OXYGEN SATURATION: 98 % | HEART RATE: 80 BPM | TEMPERATURE: 97.9 F | RESPIRATION RATE: 16 BRPM | BODY MASS INDEX: 24.38 KG/M2 | HEIGHT: 74 IN | DIASTOLIC BLOOD PRESSURE: 60 MMHG | WEIGHT: 190 LBS | SYSTOLIC BLOOD PRESSURE: 104 MMHG

## 2023-04-05 DIAGNOSIS — L03.211 CELLULITIS OF EYEBROW: ICD-10-CM

## 2023-04-05 PROCEDURE — 99213 OFFICE O/P EST LOW 20 MIN: CPT | Performed by: NURSE PRACTITIONER

## 2023-04-05 RX ORDER — DOXYCYCLINE HYCLATE 100 MG
100 TABLET ORAL 2 TIMES DAILY
Qty: 10 TABLET | Refills: 0 | Status: SHIPPED | OUTPATIENT
Start: 2023-04-05 | End: 2023-04-10

## 2023-04-05 ASSESSMENT — ENCOUNTER SYMPTOMS
DOUBLE VISION: 0
CONSTITUTIONAL NEGATIVE: 1
FEVER: 0
FACIAL SWELLING: 1
EYES NEGATIVE: 1
ROS SKIN COMMENTS: PER HPI
BLURRED VISION: 0

## 2023-04-05 ASSESSMENT — VISUAL ACUITY: OU: 1

## 2023-04-05 NOTE — PROGRESS NOTES
"Subjective:     Bimal Wallis is a 32 y.o. male who presents for Facial Swelling (Boil above RT eyebrow x4 days)       Facial Swelling  This is a new problem. The problem has been gradually worsening. Pertinent negatives include no fever.     Sunday morning, patient started to notice redness and swelling above his right eyebrow.  Denies injury.  Starting to get worse.  Reports using a clean and razor blade and performed an incision on the lesion and expressed a small amount of clear/purulent discharge.    Tdap received 10/18/2018.    Review of Systems   Constitutional: Negative.  Negative for fever.   HENT:  Positive for facial swelling.    Eyes: Negative.  Negative for blurred vision and double vision.   Skin:         Per HPI   All other systems reviewed and are negative.    Refer to HPI for additional details.    During this visit, appropriate PPE was worn, hand hygiene was performed, and the patient and any visitors were masked.    PMH:  has no past medical history on file.    MEDS:   Current Outpatient Medications:     doxycycline (VIBRAMYCIN) 100 MG Tab, Take 1 Tablet by mouth 2 times a day for 5 days., Disp: 10 Tablet, Rfl: 0    ALLERGIES:   Allergies   Allergen Reactions    Amoxicillin     Biaxin [Clarithromycin]     Pcn [Penicillins]      SURGHX:   Past Surgical History:   Procedure Laterality Date    OTHER ORTHOPEDIC SURGERY Left 2010    ORIF ankle      SOCHX:  reports that he has quit smoking. His smoking use included cigarettes. He started smoking about 15 years ago. He smoked an average of 1 pack per day. He has never used smokeless tobacco. He reports current alcohol use. He reports that he does not use drugs.    FH: Per HPI as applicable/pertinent.      Objective:     /60 (BP Location: Left arm, Patient Position: Sitting, BP Cuff Size: Large adult)   Pulse 80   Temp 36.6 °C (97.9 °F) (Temporal)   Resp 16   Ht 1.88 m (6' 2\")   Wt 86.2 kg (190 lb)   SpO2 98%   BMI 24.39 kg/m²     Physical " Exam  Nursing note reviewed.   Constitutional:       General: He is not in acute distress.     Appearance: He is well-developed. He is not ill-appearing or toxic-appearing.   HENT:      Head:        Comments: Erythema, swelling, induration with no definitive fluctuance  Eyes:      General: Vision grossly intact.         Right eye: No discharge.         Left eye: No discharge.      Extraocular Movements: Extraocular movements intact.      Conjunctiva/sclera:      Right eye: Right conjunctiva is not injected.      Left eye: Left conjunctiva is not injected.   Cardiovascular:      Rate and Rhythm: Normal rate.   Pulmonary:      Effort: Pulmonary effort is normal. No respiratory distress.   Musculoskeletal:         General: No deformity. Normal range of motion.   Skin:     General: Skin is warm and dry.      Coloration: Skin is not pale.   Neurological:      Mental Status: He is alert and oriented to person, place, and time.      Motor: No weakness.   Psychiatric:         Behavior: Behavior normal. Behavior is cooperative.       Assessment/Plan:     1. Cellulitis of eyebrow  - doxycycline (VIBRAMYCIN) 100 MG Tab; Take 1 Tablet by mouth 2 times a day for 5 days.  Dispense: 10 Tablet; Refill: 0    Rx as above sent electronically. Warm compresses. No definitive fluctuance to indicate facial I&D at this time. Monitor. Return if not improved in 3 days, or sooner if symptoms change/worsen.    Differential diagnosis, natural history, supportive care, over-the-counter symptom management per 's instructions, close monitoring, and indications for immediate follow-up discussed.     All questions answered. Patient agrees with the plan of care.

## 2025-05-27 ENCOUNTER — OFFICE VISIT (OUTPATIENT)
Dept: URGENT CARE | Facility: CLINIC | Age: 35
End: 2025-05-27
Payer: COMMERCIAL

## 2025-05-27 ENCOUNTER — HOSPITAL ENCOUNTER (OUTPATIENT)
Facility: MEDICAL CENTER | Age: 35
End: 2025-05-27
Attending: STUDENT IN AN ORGANIZED HEALTH CARE EDUCATION/TRAINING PROGRAM
Payer: COMMERCIAL

## 2025-05-27 VITALS
OXYGEN SATURATION: 98 % | BODY MASS INDEX: 25.03 KG/M2 | WEIGHT: 195 LBS | SYSTOLIC BLOOD PRESSURE: 118 MMHG | HEIGHT: 74 IN | RESPIRATION RATE: 16 BRPM | DIASTOLIC BLOOD PRESSURE: 68 MMHG | TEMPERATURE: 98 F | HEART RATE: 97 BPM

## 2025-05-27 DIAGNOSIS — Z71.1 CONCERN ABOUT STD IN MALE WITHOUT DIAGNOSIS: Primary | ICD-10-CM

## 2025-05-27 DIAGNOSIS — Z71.1 CONCERN ABOUT STD IN MALE WITHOUT DIAGNOSIS: ICD-10-CM

## 2025-05-27 PROCEDURE — 87491 CHLMYD TRACH DNA AMP PROBE: CPT

## 2025-05-27 PROCEDURE — 87591 N.GONORRHOEAE DNA AMP PROB: CPT

## 2025-05-28 LAB
C TRACH DNA SPEC QL NAA+PROBE: NEGATIVE
N GONORRHOEA DNA SPEC QL NAA+PROBE: NEGATIVE
SPECIMEN SOURCE: NORMAL

## 2025-05-28 NOTE — PROGRESS NOTES
"Subjective:   CHIEF COMPLAINT  Chief Complaint   Patient presents with    Sexually Transmitted Diseases     ST'D check  no symptoms/not sexually active        HPI     History of Present Illness  Bimal Wallis is a 34 y.o. male who presents for STD testing.    He has expressed a desire to undergo STD testing, despite the absence of any symptoms such as rashes, discharge, or pain. This will be his first experience with such testing. He does not have a primary care physician and typically seeks medical attention via urgent care.  Declined a referral to establish.        REVIEW OF SYSTEMS  General: no fever or chills  GI: no nausea or vomiting  See HPI for further details.    PAST MEDICAL HISTORY  There are no active problems to display for this patient.      SURGICAL HISTORY   has a past surgical history that includes other orthopedic surgery (Left, 2010).    ALLERGIES  Allergies[1]    CURRENT MEDICATIONS  Home Medications       Reviewed by Ander Moore D.O. (Physician) on 05/27/25 at 1616  Med List Status: <None>     Medication Last Dose Status        Patient Vishnu Taking any Medications                           SOCIAL HISTORY  Social History     Tobacco Use    Smoking status: Former     Current packs/day: 1.00     Average packs/day: 1 pack/day for 17.7 years (17.7 ttl pk-yrs)     Types: Cigarettes     Start date: 9/29/2007    Smokeless tobacco: Never   Vaping Use    Vaping status: Every Day    Substances: Nicotine   Substance and Sexual Activity    Alcohol use: Yes     Comment: rare    Drug use: No    Sexual activity: Not Currently       FAMILY HISTORY  No family history on file.       Objective:   PHYSICAL EXAM  VITAL SIGNS: /68   Pulse 97   Temp 36.7 °C (98 °F) (Temporal)   Resp 16   Ht 1.88 m (6' 2\")   Wt 88.5 kg (195 lb)   SpO2 98%   BMI 25.04 kg/m²     Gen: no acute distress, normal voice  Skin: dry, intact, moist mucosal membranes  Head: Atraumatic, normocephalic  Psych: normal affect, normal " judgement, alert, awake  Musculoskeletal:       Assessment/Plan:     1. Concern about STD in male without diagnosis  Chlamydia/GC, PCR (Urine)    HIV AG/AB Combo Assay Screening    T.Pallidum AB DIANE (Screening)    Hepatitis C Virus Antibody    HEP B Surface Antibody    Hep B Core AB Total    Hep B Surface Antigen      Asymptomatic  -Ordered STI panel  -Return to urgent care any new/worsening symptoms or further questions or concerns.  Patient understood everything discussed.  All questions were answered.        Please note that this dictation was created using voice recognition software. I have made a reasonable attempt to correct obvious errors, but I expect that there are errors of grammar and possibly content that I did not discover before finalizing the note.                [1]   Allergies  Allergen Reactions    Amoxicillin     Biaxin [Clarithromycin]     Pcn [Penicillins]

## 2025-06-01 ENCOUNTER — RESULTS FOLLOW-UP (OUTPATIENT)
Dept: URGENT CARE | Facility: CLINIC | Age: 35
End: 2025-06-01